# Patient Record
Sex: FEMALE | Race: WHITE | NOT HISPANIC OR LATINO | URBAN - METROPOLITAN AREA
[De-identification: names, ages, dates, MRNs, and addresses within clinical notes are randomized per-mention and may not be internally consistent; named-entity substitution may affect disease eponyms.]

---

## 2017-06-16 ENCOUNTER — INPATIENT (INPATIENT)
Facility: HOSPITAL | Age: 82
LOS: 3 days | Discharge: NURSING, REHAB OR LONG-TERM CARE | End: 2017-06-20
Attending: INTERNAL MEDICINE | Admitting: INTERNAL MEDICINE
Payer: MEDICARE

## 2017-06-16 VITALS
TEMPERATURE: 98 F | RESPIRATION RATE: 20 BRPM | OXYGEN SATURATION: 98 % | SYSTOLIC BLOOD PRESSURE: 127 MMHG | WEIGHT: 110.01 LBS | HEART RATE: 88 BPM | DIASTOLIC BLOOD PRESSURE: 71 MMHG

## 2017-06-16 LAB
ALBUMIN SERPL ELPH-MCNC: 2.2 G/DL — LOW (ref 3.3–5)
ALP SERPL-CCNC: 95 U/L — SIGNIFICANT CHANGE UP (ref 40–120)
ALT FLD-CCNC: <6 U/L — LOW (ref 12–78)
ANION GAP SERPL CALC-SCNC: 5 MMOL/L — SIGNIFICANT CHANGE UP (ref 5–17)
ANISOCYTOSIS BLD QL: SLIGHT — SIGNIFICANT CHANGE UP
APPEARANCE UR: (no result)
APTT BLD: 27 SEC — LOW (ref 27.5–37.4)
AST SERPL-CCNC: 12 U/L — LOW (ref 15–37)
BACTERIA # UR AUTO: (no result)
BASOPHILS # BLD AUTO: 0.6 K/UL — HIGH (ref 0–0.2)
BILIRUB SERPL-MCNC: 0.3 MG/DL — SIGNIFICANT CHANGE UP (ref 0.2–1.2)
BILIRUB UR-MCNC: NEGATIVE — SIGNIFICANT CHANGE UP
BUN SERPL-MCNC: 30 MG/DL — HIGH (ref 7–23)
CALCIUM SERPL-MCNC: 8.5 MG/DL — SIGNIFICANT CHANGE UP (ref 8.5–10.1)
CHLORIDE SERPL-SCNC: 111 MMOL/L — HIGH (ref 96–108)
CO2 SERPL-SCNC: 29 MMOL/L — SIGNIFICANT CHANGE UP (ref 22–31)
COLOR SPEC: YELLOW — SIGNIFICANT CHANGE UP
CREAT SERPL-MCNC: 1.43 MG/DL — HIGH (ref 0.5–1.3)
DIFF PNL FLD: (no result)
ELLIPTOCYTES BLD QL SMEAR: SLIGHT — SIGNIFICANT CHANGE UP
EOSINOPHIL # BLD AUTO: 0.2 K/UL — SIGNIFICANT CHANGE UP (ref 0–0.5)
EPI CELLS # UR: (no result)
GLUCOSE SERPL-MCNC: 85 MG/DL — SIGNIFICANT CHANGE UP (ref 70–99)
GLUCOSE UR QL: NEGATIVE MG/DL — SIGNIFICANT CHANGE UP
HCT VFR BLD CALC: 25.5 % — LOW (ref 34.5–45)
HGB BLD-MCNC: 7.6 G/DL — LOW (ref 11.5–15.5)
INR BLD: 1.03 RATIO — SIGNIFICANT CHANGE UP (ref 0.88–1.16)
KETONES UR-MCNC: NEGATIVE — SIGNIFICANT CHANGE UP
LACTATE SERPL-SCNC: 1.2 MMOL/L — SIGNIFICANT CHANGE UP (ref 0.7–2)
LEUKOCYTE ESTERASE UR-ACNC: (no result)
LYMPHOCYTES # BLD AUTO: 33.2 K/UL — HIGH (ref 1–3.3)
LYMPHOCYTES # BLD AUTO: 77 % — HIGH (ref 13–44)
MANUAL DIF COMMENT BLD-IMP: SIGNIFICANT CHANGE UP
MCHC RBC-ENTMCNC: 26.8 PG — LOW (ref 27–34)
MCHC RBC-ENTMCNC: 29.9 GM/DL — LOW (ref 32–36)
MCV RBC AUTO: 89.7 FL — SIGNIFICANT CHANGE UP (ref 80–100)
MICROCYTES BLD QL: SLIGHT — SIGNIFICANT CHANGE UP
MONOCYTES # BLD AUTO: 3.1 K/UL — HIGH (ref 0–0.9)
MONOCYTES NFR BLD AUTO: 1 % — LOW (ref 2–14)
NEUTROPHILS # BLD AUTO: 9.8 K/UL — HIGH (ref 1.8–7.4)
NEUTROPHILS NFR BLD AUTO: 22 % — LOW (ref 43–77)
NITRITE UR-MCNC: NEGATIVE — SIGNIFICANT CHANGE UP
PH UR: 6 — SIGNIFICANT CHANGE UP (ref 5–8)
PLAT MORPH BLD: NORMAL — SIGNIFICANT CHANGE UP
PLATELET # BLD AUTO: 216 K/UL — SIGNIFICANT CHANGE UP (ref 150–400)
POIKILOCYTOSIS BLD QL AUTO: SLIGHT — SIGNIFICANT CHANGE UP
POLYCHROMASIA BLD QL SMEAR: SLIGHT — SIGNIFICANT CHANGE UP
POTASSIUM SERPL-MCNC: 4.6 MMOL/L — SIGNIFICANT CHANGE UP (ref 3.5–5.3)
POTASSIUM SERPL-SCNC: 4.6 MMOL/L — SIGNIFICANT CHANGE UP (ref 3.5–5.3)
PROT SERPL-MCNC: 5.8 GM/DL — LOW (ref 6–8.3)
PROT UR-MCNC: 30 MG/DL
PROTHROM AB SERPL-ACNC: 11.1 SEC — SIGNIFICANT CHANGE UP (ref 9.8–12.7)
RBC # BLD: 2.84 M/UL — LOW (ref 3.8–5.2)
RBC # FLD: 18.8 % — HIGH (ref 10.3–14.5)
RBC BLD AUTO: (no result)
RBC CASTS # UR COMP ASSIST: (no result) /HPF (ref 0–4)
ROULEAUX BLD QL SMEAR: PRESENT — SIGNIFICANT CHANGE UP
SMUDGE CELLS # BLD: PRESENT — SIGNIFICANT CHANGE UP
SODIUM SERPL-SCNC: 145 MMOL/L — SIGNIFICANT CHANGE UP (ref 135–145)
SP GR SPEC: 1.02 — SIGNIFICANT CHANGE UP (ref 1.01–1.02)
TROPONIN I SERPL-MCNC: <0.015 NG/ML — SIGNIFICANT CHANGE UP (ref 0.01–0.04)
TROPONIN I SERPL-MCNC: <0.015 NG/ML — SIGNIFICANT CHANGE UP (ref 0.01–0.04)
UROBILINOGEN FLD QL: NEGATIVE MG/DL — SIGNIFICANT CHANGE UP
WBC # BLD: 47 K/UL — CRITICAL HIGH (ref 3.8–10.5)
WBC # FLD AUTO: 47 K/UL — CRITICAL HIGH (ref 3.8–10.5)
WBC UR QL: SIGNIFICANT CHANGE UP /HPF (ref 0–5)

## 2017-06-16 PROCEDURE — 71010: CPT | Mod: 26

## 2017-06-16 PROCEDURE — 70450 CT HEAD/BRAIN W/O DYE: CPT | Mod: 26

## 2017-06-16 PROCEDURE — 99285 EMERGENCY DEPT VISIT HI MDM: CPT

## 2017-06-16 PROCEDURE — 93010 ELECTROCARDIOGRAM REPORT: CPT

## 2017-06-16 RX ORDER — HEPARIN SODIUM 5000 [USP'U]/ML
5000 INJECTION INTRAVENOUS; SUBCUTANEOUS EVERY 12 HOURS
Qty: 0 | Refills: 0 | Status: DISCONTINUED | OUTPATIENT
Start: 2017-06-16 | End: 2017-06-20

## 2017-06-16 RX ORDER — MIRTAZAPINE 45 MG/1
15 TABLET, ORALLY DISINTEGRATING ORAL AT BEDTIME
Qty: 0 | Refills: 0 | Status: DISCONTINUED | OUTPATIENT
Start: 2017-06-16 | End: 2017-06-20

## 2017-06-16 RX ORDER — ONDANSETRON 8 MG/1
4 TABLET, FILM COATED ORAL EVERY 6 HOURS
Qty: 0 | Refills: 0 | Status: DISCONTINUED | OUTPATIENT
Start: 2017-06-16 | End: 2017-06-20

## 2017-06-16 RX ORDER — SODIUM CHLORIDE 9 MG/ML
1000 INJECTION INTRAMUSCULAR; INTRAVENOUS; SUBCUTANEOUS ONCE
Qty: 0 | Refills: 0 | Status: COMPLETED | OUTPATIENT
Start: 2017-06-16 | End: 2017-06-16

## 2017-06-16 RX ORDER — PANTOPRAZOLE SODIUM 20 MG/1
40 TABLET, DELAYED RELEASE ORAL
Qty: 0 | Refills: 0 | Status: DISCONTINUED | OUTPATIENT
Start: 2017-06-16 | End: 2017-06-20

## 2017-06-16 RX ORDER — DOCUSATE SODIUM 100 MG
100 CAPSULE ORAL
Qty: 0 | Refills: 0 | Status: DISCONTINUED | OUTPATIENT
Start: 2017-06-16 | End: 2017-06-20

## 2017-06-16 RX ORDER — SODIUM CHLORIDE 9 MG/ML
1000 INJECTION INTRAMUSCULAR; INTRAVENOUS; SUBCUTANEOUS
Qty: 0 | Refills: 0 | Status: DISCONTINUED | OUTPATIENT
Start: 2017-06-16 | End: 2017-06-18

## 2017-06-16 RX ADMIN — SODIUM CHLORIDE 1000 MILLILITER(S): 9 INJECTION INTRAMUSCULAR; INTRAVENOUS; SUBCUTANEOUS at 15:11

## 2017-06-16 RX ADMIN — SODIUM CHLORIDE 70 MILLILITER(S): 9 INJECTION INTRAMUSCULAR; INTRAVENOUS; SUBCUTANEOUS at 16:45

## 2017-06-16 RX ADMIN — HEPARIN SODIUM 5000 UNIT(S): 5000 INJECTION INTRAVENOUS; SUBCUTANEOUS at 17:49

## 2017-06-16 RX ADMIN — MIRTAZAPINE 15 MILLIGRAM(S): 45 TABLET, ORALLY DISINTEGRATING ORAL at 21:22

## 2017-06-16 RX ADMIN — SODIUM CHLORIDE 70 MILLILITER(S): 9 INJECTION INTRAMUSCULAR; INTRAVENOUS; SUBCUTANEOUS at 20:09

## 2017-06-16 NOTE — H&P ADULT - NSHPLABSRESULTS_GEN_ALL_CORE
CARDIAC MARKERS ( 2017 13:43 )  <0.015 ng/mL / x     / x     / x     / x                                7.6    47.0  )-----------( 216      ( 2017 13:43 )             25.5     2017 13:43    145    |  111    |  30     ----------------------------<  85     4.6     |  29     |  1.43     Ca    8.5        2017 13:43    TPro  5.8    /  Alb  2.2    /  TBili  0.3    /  DBili  x      /  AST  12     /  ALT  <6     /  AlkPhos  95     2017 13:43    PT/INR - ( 2017 13:43 )   PT: 11.1 sec;   INR: 1.03 ratio         PTT - ( 2017 13:43 )  PTT:27.0 sec  CAPILLARY BLOOD GLUCOSE    LIVER FUNCTIONS - ( 2017 13:43 )  Alb: 2.2 g/dL / Pro: 5.8 gm/dL / ALK PHOS: 95 U/L / ALT: <6 U/L / AST: 12 U/L / GGT: x           Urinalysis Basic - ( 2017 15:09 )    Color: Yellow / Appearance: very cloudy / S.020 / pH: x  Gluc: x / Ketone: Negative  / Bili: Negative / Urobili: Negative mg/dL   Blood: x / Protein: 30 mg/dL / Nitrite: Negative   Leuk Esterase: Moderate / RBC: x / WBC x   Sq Epi: x / Non Sq Epi: x / Bacteria: x

## 2017-06-16 NOTE — ED ADULT NURSE REASSESSMENT NOTE - NS ED NURSE REASSESS COMMENT FT1
Pt resting comfortable, VSS. Pt straight cath for urine sample. Scant amount of urine and sy was not advancing easily. Dr. Hobbs made aware.

## 2017-06-16 NOTE — ED ADULT TRIAGE NOTE - CHIEF COMPLAINT QUOTE
Patient presents after syncopal episode at 10:30am. As per nursing home patient has abnormal hemoglobin. As per nursing home facility patient at baseline mental status

## 2017-06-16 NOTE — ED PROVIDER NOTE - OBJECTIVE STATEMENT
89 y/o female with PMHx of dementia BIB EMS. Pt states that she lives with her daughter. Denies any pain, cough, vomiting, diarrhea. As per EMS. Pt lives at nursing home and was sent to the ED s/p syncope. 89 y/o female with PMHx of dementia BIB EMS. Pt states that she lives with her daughter. Denies any pain, cough, vomiting, diarrhea. As per EMS. Pt lives at [facility] nursing [facility] and was sent to the ED s/p syncope and unresponsiveness.

## 2017-06-16 NOTE — ED PROVIDER NOTE - PSYCHIATRIC, MLM
Alert and disoriented to person, place, time/situation. normal mood and affect. no apparent risk to self or others.

## 2017-06-16 NOTE — H&P ADULT - NSHPREVIEWOFSYSTEMS_GEN_ALL_CORE
(-)Fever, chills, cough, chest pain, sob, headache, dizziness, palpitations, abd pain, n/v/d, leg swelling  (+)weakness, fatigue

## 2017-06-16 NOTE — ED PROVIDER NOTE - PROGRESS NOTE DETAILS
Attending hector Hobbs/w Dr. rudolph for admission Attending Faby, rectal - brown stool heme +, qc reactive

## 2017-06-16 NOTE — PATIENT PROFILE ADULT. - PMH
Anemia, unspecified type    CAD (coronary artery disease)    Cerebrovascular accident (CVA), unspecified mechanism    CLL (chronic lymphocytic leukemia)    Dementia    Syncope

## 2017-06-16 NOTE — H&P ADULT - ASSESSMENT
89yo female a/w failure to thrive      # Weakness/Fatigue/Failure to thrive  - afebrile, HD stable, O2 sat 97% on room air  - UA negative, CXR without focal consolidation, no evidence of infection on labs, has chronic elevated WBC 2/2 CLL  - CT head negative  - baseline Cr 1.5 as per PMD  - PT consult  - ECHO  - monitor HH, would transfuse HH<7  - Palliative care consult, goals of care discussion    # Dementia  - fall precaution    # CKD  - Cr stable    # DVT ppx, HSQ    # Admit, stable

## 2017-06-16 NOTE — ED PROVIDER NOTE - CARE PLAN
Principal Discharge DX:	Syncope, unspecified syncope type  Secondary Diagnosis:	Anemia, unspecified type  Secondary Diagnosis:	CLL (chronic lymphocytic leukemia)

## 2017-06-16 NOTE — H&P ADULT - HISTORY OF PRESENT ILLNESS
Patient is 89yo female with PMHx of CLL, mild dementia, CAD, Anemia, presents to the hospital with weakness, fatigue, failure to thrive. As per DR Zayas, who is her physician, she has slowly been declining in function over the last few months, constituted by weakness, failure to thrive. Patient has chronic anemia, baseline HH 6-7, today, 7.6. Pt denies any major complaints, reports to be doing well. Denies fever, chills, cough, chest pain, sob, palpitations, headache, dizziness. No other constitutional symptoms.

## 2017-06-17 LAB
ANION GAP SERPL CALC-SCNC: 6 MMOL/L — SIGNIFICANT CHANGE UP (ref 5–17)
ANISOCYTOSIS BLD QL: SLIGHT — SIGNIFICANT CHANGE UP
BASOPHILS # BLD AUTO: HIGH K/UL (ref 0–0.2)
BASOPHILS NFR BLD AUTO: 0 % — SIGNIFICANT CHANGE UP (ref 0–2)
BUN SERPL-MCNC: 27 MG/DL — HIGH (ref 7–23)
BURR CELLS BLD QL SMEAR: PRESENT — SIGNIFICANT CHANGE UP
CALCIUM SERPL-MCNC: 8.3 MG/DL — LOW (ref 8.5–10.1)
CHLORIDE SERPL-SCNC: 114 MMOL/L — HIGH (ref 96–108)
CO2 SERPL-SCNC: 24 MMOL/L — SIGNIFICANT CHANGE UP (ref 22–31)
CREAT SERPL-MCNC: 1.31 MG/DL — HIGH (ref 0.5–1.3)
DACRYOCYTES BLD QL SMEAR: SLIGHT — SIGNIFICANT CHANGE UP
EOSINOPHIL # BLD AUTO: SIGNIFICANT CHANGE UP K/UL (ref 0–0.5)
EOSINOPHIL NFR BLD AUTO: 0 % — SIGNIFICANT CHANGE UP (ref 0–6)
GLUCOSE SERPL-MCNC: 104 MG/DL — HIGH (ref 70–99)
HCT VFR BLD CALC: 22.9 % — LOW (ref 34.5–45)
HGB BLD-MCNC: 7.2 G/DL — LOW (ref 11.5–15.5)
HYPOCHROMIA BLD QL: SLIGHT — SIGNIFICANT CHANGE UP
LYMPHOCYTES # BLD AUTO: 51 % — HIGH (ref 13–44)
LYMPHOCYTES # BLD AUTO: HIGH K/UL (ref 1–3.3)
LYMPHOCYTES # SPEC AUTO: 18 % — HIGH (ref 0–0)
MACROCYTES BLD QL: SLIGHT — SIGNIFICANT CHANGE UP
MANUAL DIF COMMENT BLD-IMP: SIGNIFICANT CHANGE UP
MCHC RBC-ENTMCNC: 28.1 PG — SIGNIFICANT CHANGE UP (ref 27–34)
MCHC RBC-ENTMCNC: 31.3 GM/DL — LOW (ref 32–36)
MCV RBC AUTO: 89.9 FL — SIGNIFICANT CHANGE UP (ref 80–100)
MICROCYTES BLD QL: SLIGHT — SIGNIFICANT CHANGE UP
MONOCYTES # BLD AUTO: HIGH K/UL (ref 0–0.9)
MONOCYTES NFR BLD AUTO: 2 % — SIGNIFICANT CHANGE UP (ref 2–14)
NEUTROPHILS # BLD AUTO: HIGH K/UL (ref 1.8–7.4)
NEUTROPHILS NFR BLD AUTO: 25 % — LOW (ref 43–77)
NRBC # BLD: 1 /100 — HIGH (ref 0–0)
OVALOCYTES BLD QL SMEAR: SLIGHT — SIGNIFICANT CHANGE UP
PLAT MORPH BLD: NORMAL — SIGNIFICANT CHANGE UP
PLATELET # BLD AUTO: 227 K/UL — SIGNIFICANT CHANGE UP (ref 150–400)
PLATELET COUNT - ESTIMATE: NORMAL — SIGNIFICANT CHANGE UP
POIKILOCYTOSIS BLD QL AUTO: SLIGHT — SIGNIFICANT CHANGE UP
POLYCHROMASIA BLD QL SMEAR: SLIGHT — SIGNIFICANT CHANGE UP
POTASSIUM SERPL-MCNC: 4 MMOL/L — SIGNIFICANT CHANGE UP (ref 3.5–5.3)
POTASSIUM SERPL-SCNC: 4 MMOL/L — SIGNIFICANT CHANGE UP (ref 3.5–5.3)
RBC # BLD: 2.55 M/UL — LOW (ref 3.8–5.2)
RBC # FLD: 19.4 % — HIGH (ref 10.3–14.5)
RBC BLD AUTO: (no result)
SCHISTOCYTES BLD QL AUTO: SLIGHT — SIGNIFICANT CHANGE UP
SMUDGE CELLS # BLD: PRESENT — SIGNIFICANT CHANGE UP
SODIUM SERPL-SCNC: 144 MMOL/L — SIGNIFICANT CHANGE UP (ref 135–145)
VARIANT LYMPHS # BLD: 4 % — SIGNIFICANT CHANGE UP (ref 0–6)
WBC # BLD: 52.4 K/UL — CRITICAL HIGH (ref 3.8–10.5)
WBC # FLD AUTO: 52.4 K/UL — CRITICAL HIGH (ref 3.8–10.5)

## 2017-06-17 PROCEDURE — 93010 ELECTROCARDIOGRAM REPORT: CPT

## 2017-06-17 RX ORDER — CEFTRIAXONE 500 MG/1
1 INJECTION, POWDER, FOR SOLUTION INTRAMUSCULAR; INTRAVENOUS ONCE
Qty: 0 | Refills: 0 | Status: COMPLETED | OUTPATIENT
Start: 2017-06-17 | End: 2017-06-17

## 2017-06-17 RX ORDER — SODIUM CHLORIDE 9 MG/ML
1000 INJECTION INTRAMUSCULAR; INTRAVENOUS; SUBCUTANEOUS ONCE
Qty: 0 | Refills: 0 | Status: COMPLETED | OUTPATIENT
Start: 2017-06-17 | End: 2017-06-17

## 2017-06-17 RX ORDER — CEFTRIAXONE 500 MG/1
INJECTION, POWDER, FOR SOLUTION INTRAMUSCULAR; INTRAVENOUS
Qty: 0 | Refills: 0 | Status: DISCONTINUED | OUTPATIENT
Start: 2017-06-17 | End: 2017-06-18

## 2017-06-17 RX ORDER — CEFTRIAXONE 500 MG/1
1 INJECTION, POWDER, FOR SOLUTION INTRAMUSCULAR; INTRAVENOUS EVERY 24 HOURS
Qty: 0 | Refills: 0 | Status: DISCONTINUED | OUTPATIENT
Start: 2017-06-18 | End: 2017-06-18

## 2017-06-17 RX ADMIN — SODIUM CHLORIDE 1000 MILLILITER(S): 9 INJECTION INTRAMUSCULAR; INTRAVENOUS; SUBCUTANEOUS at 13:47

## 2017-06-17 RX ADMIN — SODIUM CHLORIDE 70 MILLILITER(S): 9 INJECTION INTRAMUSCULAR; INTRAVENOUS; SUBCUTANEOUS at 08:24

## 2017-06-17 RX ADMIN — SODIUM CHLORIDE 70 MILLILITER(S): 9 INJECTION INTRAMUSCULAR; INTRAVENOUS; SUBCUTANEOUS at 14:03

## 2017-06-17 RX ADMIN — MIRTAZAPINE 15 MILLIGRAM(S): 45 TABLET, ORALLY DISINTEGRATING ORAL at 22:11

## 2017-06-17 RX ADMIN — HEPARIN SODIUM 5000 UNIT(S): 5000 INJECTION INTRAVENOUS; SUBCUTANEOUS at 17:38

## 2017-06-17 RX ADMIN — HEPARIN SODIUM 5000 UNIT(S): 5000 INJECTION INTRAVENOUS; SUBCUTANEOUS at 05:30

## 2017-06-17 RX ADMIN — CEFTRIAXONE 100 GRAM(S): 500 INJECTION, POWDER, FOR SOLUTION INTRAMUSCULAR; INTRAVENOUS at 14:03

## 2017-06-17 NOTE — DIETITIAN INITIAL EVALUATION ADULT. - NS AS NUTRI DX NUTRIENT
NFPE reveals severe muscle wasting: temporal, clavicle, scapula. Severe fat loss: Ocular, tricep, and ribs. Weight history unknown. Pt meets criteria for severe protein/calorie malnutrition in context of chronic illness secondary to severe body fat loss, and severe muscle wasting./Malnutrition

## 2017-06-17 NOTE — CONSULT NOTE ADULT - ASSESSMENT
89 yo female coming from [place] w/ PMHx CLL, Esophageal adenocarcinoma, Stricture of GE junction s/p stent in 2/2016, s/p XRT, HTN, CVA w/ expressive aphasia 8/16, dementia, anemia,  admitted 6/16 for syncope, unresponsiveness, weakness, fatigue, and overall failure to thrive, with noted decline over past few months. Found to be anemic, 7.7, now with episodes of hypotension, and combativeness preventing IV insertion/integrity. Palliative care called to help establish GOC. Of note seen by team in past, and declined palliative services on discharge.    1. Altered mental status -  - likely also had element of dementia with psych eval on prior admission showing 19/30 MME  - also hx of left cerebral infarct with mild expressive aphasia  - appears to be at baseline  - fall and aspiration precautions  - frequent reorientation    2) Hypotension  - likely dehydrated  - giving IvF as possible based on availability of IV access    3) Anemia  - seems to be at baseline  - also with hx of esophageal ca and CLL    4) Malnutrition  - severe malnutrition  - albumin 2.2 on admission  - PPS<40%, decline further from last admission    5) Prognosis  - overall poor prognosis  - given advanced age, dementia, CLL, anemia, hypotension inability to maintain adequate nutrition, falls and PPS<40%, hx of esophogeal ca, pt would fit criteria for hospice with either diagnosis of dementia or cancer     6) GOC/Advanced Directives  - pt does not have capacity for decision making, on last admission this was confirmed by psych  - no official HCP, thus surrogate decision maker would be her daughter Bailey Le [phone] followed by nephew Fe [phone]  - full code, despite extensive discussion with primary and follow up by our team  - at this time daughter was not interested in meeting with us or having us on board. She also does not want to set any limits, having heard all the risks to the patient or worsened QOL, they would like a trial.     Given this sentiment and pt's apparent comfort will sign off at this time. Of course if family changes their mind and would like our support, please do not hesitate to call us back.    Thank you for including us in Ms. Le's care. Will continue to follow with you.    Devin Cummins MD  Palliative Care Attending 89 yo female coming from [place] w/ PMHx CLL, Esophageal adenocarcinoma, Stricture of GE junction s/p stent in 2/2016, s/p XRT, HTN, CVA w/ expressive aphasia 8/16, dementia, anemia,  admitted 6/16 for syncope, unresponsiveness, weakness, fatigue, and overall failure to thrive, with noted decline over past few months. Found to be anemic, 7.7, now with episodes of hypotension, and combativeness preventing IV insertion/integrity. Palliative care called to help establish GOC. Of note seen by team in past, and declined palliative services on discharge.    1. Altered mental status -  - likely also had element of dementia with psych eval on prior admission showing 19/30 MME  - also hx of left cerebral infarct with mild expressive aphasia  - appears to be at baseline  - fall and aspiration precautions  - frequent reorientation    2) Hypotension  - likely dehydrated  - giving IvF as possible based on availability of IV access    3) Anemia  - seems to be at baseline  - also with hx of esophageal ca and CLL    4) Malnutrition  - severe malnutrition  - albumin 2.2 on admission  - PPS<40%, decline further from last admission    5) Prognosis  - overall poor prognosis  - given advanced age, dementia, CLL, anemia, hypotension inability to maintain adequate nutrition, falls and PPS<40%, hx of esophogeal ca, pt would fit criteria for hospice with either diagnosis of dementia or cancer     6) GOC/Advanced Directives  - pt does not have capacity for decision making, on last admission this was confirmed by psych  - no official HCP, thus surrogate decision maker would be her daughter Bailey Le [phone] followed by nephew Fe [phone]  - full code, despite extensive discussion with primary and follow up by our team  - at this time daughter was not interested in meeting with us or having us on board. She also does not want to set any limits, having heard all the risks to the patient or worsened QOL, they would like a trial. *Spent 20 minutes discussing GOC and advanced directives with daughter, including code status and the risks associated with CPR in light of the above.    Given this sentiment and pt's apparent comfort will sign off at this time. Of course if family changes their mind and would like our support, please do not hesitate to call us back.    Thank you for including us in Ms. Le's care. Will continue to follow with you.    Devin Cummins MD  Palliative Care Attending

## 2017-06-17 NOTE — DIETITIAN INITIAL EVALUATION ADULT. - OTHER INFO
Nutrition consult received secondary to FTT. Pt unable to be interviewed secondary to lethargy and cognitive status (seems confused). PO intake poor at this time with total assistance. No pressure ulcers noted. +3 edema (L/knee)(L/leg)(L/ankle)(L/foot). NFPE reveals severe muscle wasting: temporal, clavicle, scapula. Severe fat loss: Ocular, tricep, and ribs. Weight history unknown. Pt meets criteria for severe protein/calorie malnutrition in context of chronic illness secondary to severe body fat loss, and severe muscle wasting. Suggest additional supplementation Ensure enlive 8oz. po TID and Prosource 1oz. po TID. Recommend SLP evaluation to determine appropriate po fluid/food consistency to promote optimal nutrition and decrease risk of aspiration.

## 2017-06-17 NOTE — PROGRESS NOTE ADULT - SUBJECTIVE AND OBJECTIVE BOX
CHIEF COMPLAINT: weakness, failure to thrive    SUBJECTIVE: This AM pt refusing IV, uncooperative with staff, refusing po.  D/w daughter on who is the medical decision maker on mult occasions- wants full code.     REVIEW OF SYSTEMS:  All other review of systems is negative unless indicated above    Vital Signs Last 24 Hrs  T(C): 36.6, Max: 36.9 (06-16 @ 17:46)  T(F): 97.9, Max: 98.4 (06-16 @ 17:46)  HR: 99 (81 - 110)  BP: 126/72 (91/46 - 146/78)  BP(mean): --  RR: 17 (17 - 18)  SpO2: 90% (82% - 97%)    PHYSICAL EXAM:  Constitutional: NAD, AO x2.   HEENT: EOMI,  MMM  Neck: Soft and supple, No JVD  Respiratory: Breath sounds are clear bilaterally, No wheezing, rales or rhonchi  Cardiovascular: S1S2 irregular. tachycardic.  Gastrointestinal: Bowel Sounds present, soft, nontender, nondistended, no guarding, no rebound  Extremities: No peripheral edema  Vascular: 2+ peripheral pulses  Neurological: A/O x 3, no focal deficits  Musculoskeletal: 5/5 strength b/l upper and lower extremities  Skin: No rashes    MEDICATIONS:  MEDICATIONS  (STANDING):  docusate sodium 100milliGRAM(s) Oral two times a day  pantoprazole    Tablet 40milliGRAM(s) Oral before breakfast  mirtazapine 15milliGRAM(s) Oral at bedtime  heparin  Injectable 5000Unit(s) SubCutaneous every 12 hours  sodium chloride 0.9%. 1000milliLiter(s) IV Continuous <Continuous>  multivitamin 1Tablet(s) Oral daily  cefTRIAXone   IVPB  IV Intermittent       LABS: All Labs Reviewed:                        7.2    52.4  )-----------( 227      ( 17 Jun 2017 12:08 )             22.9     06-17    144  |  114<H>  |  27<H>  ----------------------------<  104<H>  4.0   |  24  |  1.31<H>    Ca    8.3<L>      17 Jun 2017 12:08    TPro  5.8<L>  /  Alb  2.2<L>  /  TBili  0.3  /  DBili  x   /  AST  12<L>  /  ALT  <6<L>  /  AlkPhos  95  06-16    PT/INR - ( 16 Jun 2017 13:43 )   PT: 11.1 sec;   INR: 1.03 ratio         PTT - ( 16 Jun 2017 13:43 )  PTT:27.0 sec  CARDIAC MARKERS ( 16 Jun 2017 16:04 )  <0.015 ng/mL / x     / x     / x     / x      CARDIAC MARKERS ( 16 Jun 2017 13:43 )  <0.015 ng/mL / x     / x     / x     / x
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
## 2017-06-17 NOTE — PROGRESS NOTE ADULT - ASSESSMENT
89yo female a/w failure to thrive and possible sepsis    # Weakness/Fatigue/Failure to thrive possibly 2/2 sepsis  - afebrile, HD stable, O2 sat 97% on room air.  Tachycardic, WBC elevated d/t underlying leukemia.   - UA positive however is comtaminated w many sq epis- will repeat  , CXR without focal consolidation, has chronic elevated WBC 2/2 CLL  - CT head negative  - baseline Cr 1.5 as per PMD  - PT consult  - ECHO  - monitor HH, would transfuse HH<7  - Rocephin D1  - Palliative care consult, goals of care discussion- daughter wants full code for now, still deciding.    # Dehydration  - IVF    # Tachycardia (~140), questionable episode of afib  - No afib on EKG  - transfer tele for 24 hr- if no afib transfer back to med-surg    # Dementia  - fall precaution    # CKD  - Cr stable    # DVT ppx, HSQ    # Admit, stable

## 2017-06-18 LAB
ANION GAP SERPL CALC-SCNC: 6 MMOL/L — SIGNIFICANT CHANGE UP (ref 5–17)
BUN SERPL-MCNC: 23 MG/DL — SIGNIFICANT CHANGE UP (ref 7–23)
CALCIUM SERPL-MCNC: 7.6 MG/DL — LOW (ref 8.5–10.1)
CHLORIDE SERPL-SCNC: 116 MMOL/L — HIGH (ref 96–108)
CO2 SERPL-SCNC: 26 MMOL/L — SIGNIFICANT CHANGE UP (ref 22–31)
CREAT SERPL-MCNC: 1.21 MG/DL — SIGNIFICANT CHANGE UP (ref 0.5–1.3)
GLUCOSE SERPL-MCNC: 83 MG/DL — SIGNIFICANT CHANGE UP (ref 70–99)
POTASSIUM SERPL-MCNC: 3.8 MMOL/L — SIGNIFICANT CHANGE UP (ref 3.5–5.3)
POTASSIUM SERPL-SCNC: 3.8 MMOL/L — SIGNIFICANT CHANGE UP (ref 3.5–5.3)
SODIUM SERPL-SCNC: 148 MMOL/L — HIGH (ref 135–145)

## 2017-06-18 RX ORDER — SODIUM CHLORIDE 9 MG/ML
1000 INJECTION, SOLUTION INTRAVENOUS
Qty: 0 | Refills: 0 | Status: DISCONTINUED | OUTPATIENT
Start: 2017-06-18 | End: 2017-06-18

## 2017-06-18 RX ORDER — CEFUROXIME AXETIL 250 MG
250 TABLET ORAL EVERY 12 HOURS
Qty: 0 | Refills: 0 | Status: DISCONTINUED | OUTPATIENT
Start: 2017-06-18 | End: 2017-06-20

## 2017-06-18 RX ADMIN — Medication 100 MILLIGRAM(S): at 17:01

## 2017-06-18 RX ADMIN — HEPARIN SODIUM 5000 UNIT(S): 5000 INJECTION INTRAVENOUS; SUBCUTANEOUS at 17:01

## 2017-06-18 RX ADMIN — Medication 250 MILLIGRAM(S): at 17:01

## 2017-06-18 RX ADMIN — CEFTRIAXONE 100 GRAM(S): 500 INJECTION, POWDER, FOR SOLUTION INTRAMUSCULAR; INTRAVENOUS at 09:52

## 2017-06-18 NOTE — PROGRESS NOTE ADULT - ASSESSMENT
89yo female a/w failure to thrive and possible sepsis    # Weakness/Fatigue/Failure to thrive possibly 2/2 sepsis  - afebrile, HD stable, O2 sat 97% on room air.  Tachycardic, WBC elevated d/t underlying leukemia.   - UA positive however is comtaminated w many sq epis.  Ucx was not sent.  , CXR without focal consolidation, has chronic elevated WBC 2/2 CLL  - CT head negative  - baseline Cr 1.5 as per PMD  - PT consult  - ECHO refused by patient  - monitor HH, would transfuse HH<7  - got dose of rocephin however refusing IV. switch to po ceftin day 2 abx.  - Palliative care consult appreciated, goals of care discussion- daughter wants full code for now, still deciding. Pall care signed off for now.  I had a long discussion with the daughter who still wants patient to be a full code.    # Dehydration- improved  - IVF stopped d/t no IV access, pt refusing new IV. encourage po.    # Tachycardia (~140), PAT.  - No afib on EKG or on tele monitor, few episodes PAT on monitor  - transfer back to med-surg    # Dementia  - fall precaution    #SHEN on CKD3- resolved  - likely prerenal    # DVT ppx, HSQ    Dispo: transfer back to med-surg. Likely dc back to [facility] tomorrow with course of po abx. 89yo female a/w failure to thrive and possible sepsis    # Weakness/Fatigue/Failure to thrive possibly 2/2 sepsis  - afebrile, HD stable, O2 sat 97% on room air.  Tachycardic, WBC elevated d/t underlying leukemia.   - UA positive however is comtaminated w many sq epis.  Ucx was not sent.  , CXR without focal consolidation, has chronic elevated WBC 2/2 CLL  - CT head negative  - baseline Cr 1.5 as per PMD  - PT consult  - ECHO refused by patient  - monitor HH, would transfuse HH<7  - got dose of rocephin however refusing IV. switch to po ceftin day 2 abx.  - Palliative care consult appreciated, goals of care discussion- daughter wants full code for now, still deciding. Pall care signed off for now.  I had a long discussion with the daughter who still wants patient to be a full code.    # Dehydration- improved  - IVF stopped d/t no IV access, pt refusing new IV. encourage po.    # Hypernatremia  - encourage po as pt refusing IV    # Tachycardia (~140), PAT.  - No afib on EKG or on tele monitor, few episodes PAT on monitor  - transfer back to med-surg    # Dementia  - fall precaution    #SHEN on CKD3- resolved  - likely prerenal    # Severe protein calorie malnutrition  - ensure enlive supplement    # DVT ppx, HSQ    Dispo:  transfer back to med-surg. Lik[facility] dc back to [facility] tomorrow with course of po abx.

## 2017-06-18 NOTE — PROGRESS NOTE ADULT - SUBJECTIVE AND OBJECTIVE BOX
CHIEF COMPLAINT: weakness, failure to thrive    SUBJECTIVE: Pt pulled out IV and refused new IV.  Does not want IVF or IV abx. Agreeable to oral antibiotics. Has not complaints.    REVIEW OF SYSTEMS:  All other review of systems is negative unless indicated above    Vital Signs Last 24 Hrs  T(C): 36.7, Max: 36.9 (06-18 @ 05:39)  T(F): 98.1, Max: 98.4 (06-18 @ 05:39)  HR: 88 (82 - 99)  BP: 165/80 (112/52 - 165/80)  BP(mean): 66 (66 - 66)  RR: 18 (17 - 18)  SpO2: 95% (90% - 96%)    PHYSICAL EXAM:  Constitutional: NAD, AO x2.   HEENT: EOMI,  MMM  Neck: Soft and supple, No JVD  Respiratory: Breath sounds are clear bilaterally, No wheezing, rales or rhonchi  Cardiovascular: S1S2 irregular. tachycardic.  Gastrointestinal: Bowel Sounds present, soft, nontender, nondistended, no guarding, no rebound  Extremities: No peripheral edema  Vascular: 2+ peripheral pulses  Neurological: A/O x 3, no focal deficits  Musculoskeletal: 5/5 strength b/l upper and lower extremities  Skin: No rashes    MEDICATIONS:  MEDICATIONS  (STANDING):  docusate sodium 100milliGRAM(s) Oral two times a day  pantoprazole    Tablet 40milliGRAM(s) Oral before breakfast  mirtazapine 15milliGRAM(s) Oral at bedtime  heparin  Injectable 5000Unit(s) SubCutaneous every 12 hours  multivitamin 1Tablet(s) Oral daily  cefuroxime   Tablet 250milliGRAM(s) Oral every 12 hours      LABS: All Labs Reviewed:                        7.2    52.4  )-----------( 227      ( 17 Jun 2017 12:08 )             22.9     06-18    148<H>  |  116<H>  |  23  ----------------------------<  83  3.8   |  26  |  1.21    Ca    7.6<L>      18 Jun 2017 06:00      CARDIAC MARKERS ( 16 Jun 2017 16:04 )  <0.015 ng/mL / x     / x     / x     / x        Blood Culture: 06-16 @ 16:04  Organism --  Gram Stain Blood -- Gram Stain --  Specimen Source .Blood None  Culture-Blood --    06-16 @ 16:03  Organism --  Gram Stain Blood -- Gram Stain --  Specimen Source .Blood None  Culture-Blood --

## 2017-06-19 LAB
ANION GAP SERPL CALC-SCNC: 6 MMOL/L — SIGNIFICANT CHANGE UP (ref 5–17)
BUN SERPL-MCNC: 28 MG/DL — HIGH (ref 7–23)
CALCIUM SERPL-MCNC: 8.1 MG/DL — LOW (ref 8.5–10.1)
CHLORIDE SERPL-SCNC: 117 MMOL/L — HIGH (ref 96–108)
CO2 SERPL-SCNC: 25 MMOL/L — SIGNIFICANT CHANGE UP (ref 22–31)
CREAT SERPL-MCNC: 1.17 MG/DL — SIGNIFICANT CHANGE UP (ref 0.5–1.3)
GLUCOSE SERPL-MCNC: 94 MG/DL — SIGNIFICANT CHANGE UP (ref 70–99)
POTASSIUM SERPL-MCNC: 3.9 MMOL/L — SIGNIFICANT CHANGE UP (ref 3.5–5.3)
POTASSIUM SERPL-SCNC: 3.9 MMOL/L — SIGNIFICANT CHANGE UP (ref 3.5–5.3)
SODIUM SERPL-SCNC: 148 MMOL/L — HIGH (ref 135–145)

## 2017-06-19 RX ORDER — CEFUROXIME AXETIL 250 MG
1 TABLET ORAL
Qty: 0 | Refills: 0 | COMMUNITY
Start: 2017-06-19

## 2017-06-19 RX ORDER — MIRTAZAPINE 45 MG/1
1 TABLET, ORALLY DISINTEGRATING ORAL
Qty: 0 | Refills: 0 | COMMUNITY
Start: 2017-06-19

## 2017-06-19 RX ORDER — PANTOPRAZOLE SODIUM 20 MG/1
1 TABLET, DELAYED RELEASE ORAL
Qty: 0 | Refills: 0 | COMMUNITY
Start: 2017-06-19

## 2017-06-19 RX ORDER — DOCUSATE SODIUM 100 MG
1 CAPSULE ORAL
Qty: 0 | Refills: 0 | COMMUNITY
Start: 2017-06-19

## 2017-06-19 RX ADMIN — MIRTAZAPINE 15 MILLIGRAM(S): 45 TABLET, ORALLY DISINTEGRATING ORAL at 22:23

## 2017-06-19 RX ADMIN — HEPARIN SODIUM 5000 UNIT(S): 5000 INJECTION INTRAVENOUS; SUBCUTANEOUS at 05:36

## 2017-06-19 RX ADMIN — Medication 250 MILLIGRAM(S): at 05:36

## 2017-06-19 RX ADMIN — PANTOPRAZOLE SODIUM 40 MILLIGRAM(S): 20 TABLET, DELAYED RELEASE ORAL at 06:12

## 2017-06-19 RX ADMIN — Medication 250 MILLIGRAM(S): at 17:40

## 2017-06-19 RX ADMIN — Medication 100 MILLIGRAM(S): at 05:36

## 2017-06-19 RX ADMIN — HEPARIN SODIUM 5000 UNIT(S): 5000 INJECTION INTRAVENOUS; SUBCUTANEOUS at 17:40

## 2017-06-19 NOTE — DISCHARGE NOTE ADULT - PLAN OF CARE
supportive care follow up with your pcp and outpatient specialists. supportive care. You may need a blood transfusion in the future. Please follow up with your pcp and or hematologist/oncologist. Please follow up with your pcp and or hematologist/oncologist. take antibiotics as prescribed.

## 2017-06-19 NOTE — DISCHARGE NOTE ADULT - HOSPITAL COURSE
7yo female with PMHx of CLL, mild dementia, CAD, Anemia, presents to the hospital with weakness, fatigue, failure to thrive. As per DR Zayas, who is her physician, she has slowly been declining in function over the last few months, constituted by weakness, failure to thrive. Patient has chronic anemia, baseline HH 6-7, today, 7.6. Pt denies any major complaints, reports to be doing well. Denies fever, chills, cough, chest pain, sob, palpitations, headache, dizziness. No other constitutional symptoms.  Pt was admitted for possible sepsis and UTI for which she was given IV abx but later on pt refused medical intervention and so medications were changed to oral and fluids were stopped.   Her weakness/Fatigue/FTT is multi-factorial and declining progressively, likely related to underlying acute on chronic comorbidities including CLL/anemia, dementia, severe protein malnutrition and possible UTI. She has been afebrile, HD stable, O2 sat 97% on room air.  Tachycardic, WBC elevated d/t underlying leukemia. UA positive however is comtaminated w many sq epis.  Ucx was not sent. CXR without focal consolidation, has chronic elevated WBC 2/2 CLL. CT head negative.  She was ruled out for significant acute issues. BCx negative.  ECHO refused by patient. She got dose of rocephin however refusing IV. switch to po ceftin for abx course. Palliative care consult appreciated, goals of care discussion- daughter wants full code for now, still deciding. Pall care signed off.  I had a long discussion with the daughter who still wants patient to be a full code. Her dehydration- improved however IVF stopped d/t no IV access, pt refusing new IV. encourage po. Her hypernatremia remains boderline, secondary to poor po intake. Encourage po as pt refusing IV.  Pt also with lower extremity edema worse on the left: Multifactorial, poor albumin/nutrition, poor functional status, underlying CLL and comorbidities.  Her Tachycardia (~140), PAT with no afib on EKG or on tele monitor, few episodes PAT on monitor. She will need outpt follow up. 7yo female with PMHx of CLL, mild dementia, CAD, Anemia, presents to the hospital with weakness, fatigue, failure to thrive. As per DR Zayas, who is her physician, she has slowly been declining in function over the last few months, constituted by weakness, failure to thrive. Patient has chronic anemia, baseline HH 6-7, today, 7.6. Pt denies any major complaints, reports to be doing well. Denies fever, chills, cough, chest pain, sob, palpitations, headache, dizziness. No other constitutional symptoms.  Pt was admitted for possible sepsis and UTI for which she was given IV abx but later on pt refused medical intervention and so medications were changed to oral and fluids were stopped.   Her weakness/Fatigue/FTT is multi-factorial and declining progressively, likely related to underlying acute on chronic comorbidities including CLL/anemia, dementia, severe protein malnutrition and possible UTI. She has been afebrile, HD stable, O2 sat 97% on room air.  Tachycardic, WBC elevated d/t underlying leukemia. UA positive however is comtaminated w many sq epis.  Ucx was not sent. CXR without focal consolidation, has chronic elevated WBC 2/2 CLL. CT head negative.  She was ruled out for significant acute issues. BCx negative.  ECHO refused by patient. She got dose of rocephin however refusing IV. switch to po ceftin for abx course. Palliative care consult appreciated, goals of care discussion- daughter wants full code for now, still deciding. Pall care signed off.  I had a long discussion with the daughter who still wants patient to be a full code. Her dehydration- improved however IVF stopped d/t no IV access, pt refusing new IV. encourage po. Her hypernatremia remains boderline, secondary to poor po intake. Encourage po as pt refusing IV.  Pt also with lower extremity edema worse on the left: Multifactorial, poor albumin/nutrition, poor functional status, underlying CLL and comorbidities.  Her Tachycardia (~140), PAT with no afib on EKG or on tele monitor, few episodes PAT on monitor. She will need outpt follow up.  Pt was made DNR/DNI on 6/20//17.

## 2017-06-19 NOTE — PHYSICAL THERAPY INITIAL EVALUATION ADULT - ACTIVE RANGE OF MOTION EXAMINATION, REHAB EVAL
bilateral  lower extremity Active ROM was WFL (within functional limits)/b/l shoulder flexion limited passive and actively, decreased L active knee extension/bilateral upper extremity Active ROM was WFL (within functional limits)/deficits as listed below

## 2017-06-19 NOTE — DISCHARGE NOTE ADULT - CARE PROVIDER_API CALL
Mehran Frost (MD), Internal Medicine  [address] [facility]   [address] [place] [address]  Phone: [phone]  Fax: [phone]

## 2017-06-19 NOTE — DISCHARGE NOTE ADULT - MEDICATION SUMMARY - MEDICATIONS TO TAKE
I will START or STAY ON the medications listed below when I get home from the hospital:    mirtazapine 15 mg oral tablet  -- 1 tab(s) by mouth once a day (at bedtime)  -- Indication: For malnutrition    cefuroxime 250 mg oral tablet  -- 1 tab(s) by mouth every 12 hours until 6/21  -- Indication: For uti    docusate sodium 100 mg oral capsule  -- 1 cap(s) by mouth 2 times a day  -- Indication: For Constipation    pantoprazole 40 mg oral delayed release tablet  -- 1 tab(s) by mouth once a day (before a meal)  -- Indication: For gerd    Multiple Vitamins oral tablet  -- 1 tab(s) by mouth once a day  -- Indication: For vitamins

## 2017-06-19 NOTE — PHYSICAL THERAPY INITIAL EVALUATION ADULT - ADDITIONAL COMMENTS
History taken from pt. who reports living in house. SW states pt. lives w/ pt.'s daughter. Pt. reports having 4 steps to ambulate in house and ambulates independently.

## 2017-06-19 NOTE — PHYSICAL THERAPY INITIAL EVALUATION ADULT - PERTINENT HX OF CURRENT PROBLEM, REHAB EVAL
Pt. presents to hospital w/ weakness, fatigue and failure to thrive. Pt. has chronic anemia. CTH: (-); CXR: small L pleural effusion

## 2017-06-19 NOTE — PROGRESS NOTE ADULT - SUBJECTIVE AND OBJECTIVE BOX
CHIEF COMPLAINT: weakness, failure to thrive    SUBJECTIVE: Pt pulled out IV and refused new IV.  Does not want IVF or IV abx. Agreeable to oral antibiotics. Has not complaints.    REVIEW OF SYSTEMS:  All other review of systems is negative unless indicated above    Vital Signs Last 24 Hrs  T(C): 36.7, Max: 36.9 (06-18 @ 05:39)  T(F): 98.1, Max: 98.4 (06-18 @ 05:39)  HR: 88 (82 - 99)  BP: 165/80 (112/52 - 165/80)  BP(mean): 66 (66 - 66)  RR: 18 (17 - 18)  SpO2: 95% (90% - 96%)    PHYSICAL EXAM:  Constitutional: NAD, AO x2.   HEENT: EOMI,  MMM  Neck: Soft and supple, No JVD  Respiratory: Breath sounds are clear bilaterally, No wheezing, rales or rhonchi  Cardiovascular: S1S2 irregular. tachycardic.  Gastrointestinal: Bowel Sounds present, soft, nontender, nondistended, no guarding, no rebound  Extremities: No peripheral edema  Vascular: 2+ peripheral pulses  Neurological: A/O x 3, no focal deficits  Musculoskeletal: 5/5 strength b/l upper and lower extremities  Skin: No rashes    MEDICATIONS:  MEDICATIONS  (STANDING):  docusate sodium 100milliGRAM(s) Oral two times a day  pantoprazole    Tablet 40milliGRAM(s) Oral before breakfast  mirtazapine 15milliGRAM(s) Oral at bedtime  heparin  Injectable 5000Unit(s) SubCutaneous every 12 hours  multivitamin 1Tablet(s) Oral daily  cefuroxime   Tablet 250milliGRAM(s) Oral every 12 hours      LABS:         06-19    148<H>  |  117<H>  |  28<H>  ----------------------------<  94  3.9   |  25  |  1.17    Ca    8.1<L>      19 Jun 2017 06:07      CAPILLARY BLOOD GLUCOSE                  Assessment and Plan:   · Assessment		  89yo female a/w failure to thrive and possible sepsis    # Weakness/Fatigue/Failure - multi-factorial, likely related to underlying acute on chronic comorbidities including CLL/anemia, dementia, severe protein malnutrition and possible UTI.  - afebrile, HD stable, O2 sat 97% on room air.  Tachycardic, WBC elevated d/t underlying leukemia.   - UA positive however is comtaminated w many sq epis.  Ucx was not sent.  , CXR without focal consolidation, has chronic elevated WBC 2/2 CLL  - CT head negative  - BCx negative.  - baseline Cr 1.5 as per PMD  - PT consult appreciated.  - ECHO refused by patient  - monitor HH, would transfuse HH<7  - got dose of rocephin however refusing IV. switch to po ceftin day 2 abx.  - Palliative care consult appreciated, goals of care discussion- daughter wants full code for now, still deciding. Pall care signed off.  I had a long discussion with the daughter who still wants patient to be a full code.    # Dehydration- improved  - IVF stopped d/t no IV access, pt refusing new IV. encourage po.    # Hypernatremia: Borderline, secondary to poor po intake.  - encourage po as pt refusing IV    Lower extremity edema: Multifactorial, poor albumin/nutrition, poor functional status, underlying CLL and comorbidities.     # Tachycardia (~140), PAT.  - No afib on EKG or on tele monitor, few episodes PAT on monitor  - outpt follow up.    # Dementia  - fall precaution    #SHEN on CKD3- resolved  - likely prerenal    # Severe protein calorie malnutrition  - ensure enlive supplement    # DVT ppx, HSQ    [facility]:  -[facility] to [facility].  Pt refusing a lot of treatment.  Her overall prognosis is very poor however family does not wish to discuss goals of care or pursue comfort care measures.  Pt is very high risk for 30 day readmission given this.     Attending Statement:  40 minutes spent on total encounter; more than 50% of the visit was spent counseling and/or coordinating care by the attending physician.

## 2017-06-19 NOTE — DISCHARGE NOTE ADULT - PATIENT PORTAL LINK FT
“You can access the [employer] Patient Portal, offered by [facility], by registering with the following website: [email or web address]”

## 2017-06-19 NOTE — DISCHARGE NOTE ADULT - CARE PLAN
Principal Discharge DX:	Weakness generalized  Goal:	supportive care  Instructions for follow-up, activity and diet:	follow up with your pcp and outpatient specialists.  Secondary Diagnosis:	Anemia, unspecified type  Goal:	supportive care.  Instructions for follow-up, activity and diet:	You may need a blood transfusion in the future. Please follow up with your pcp and or hematologist/oncologist.  Secondary Diagnosis:	CLL (chronic lymphocytic leukemia)  Goal:	supportive care.  Instructions for follow-up, activity and diet:	Please follow up with your pcp and or hematologist/oncologist.  Secondary Diagnosis:	Dementia  Goal:	supportive care.  Secondary Diagnosis:	Urinary tract infection without hematuria, site unspecified  Goal:	take antibiotics as prescribed.

## 2017-06-19 NOTE — DISCHARGE NOTE ADULT - SECONDARY DIAGNOSIS.
Anemia, unspecified type CLL (chronic lymphocytic leukemia) Dementia Urinary tract infection without hematuria, site unspecified

## 2017-06-20 VITALS
TEMPERATURE: 98 F | OXYGEN SATURATION: 91 % | DIASTOLIC BLOOD PRESSURE: 65 MMHG | SYSTOLIC BLOOD PRESSURE: 116 MMHG | RESPIRATION RATE: 16 BRPM | HEART RATE: 94 BPM

## 2017-06-20 NOTE — PROGRESS NOTE ADULT - SUBJECTIVE AND OBJECTIVE BOX
CHIEF COMPLAINT: weakness, failure to thrive    SUBJECTIVE: No new events. Pt status quo. Eating lunch.  Spoke with HCP about DNR/DNI.  Wishes for pt to be DNR/DNI.  They would like her to go back to the facility at this time.    REVIEW OF SYSTEMS:  All other review of systems is negative unless indicated above    Vital Signs Last 24 Hrs  T(C): 36.6, Max: 36.8 (06-20 @ 05:08)  T(F): 97.8, Max: 98.3 (06-20 @ 05:08)  HR: 76 (76 - 91)  BP: 98/69 (98/69 - 104/58)  BP(mean): 77 (70 - 77)  RR: 16 (16 - 18)  SpO2: 95% (94% - 96%)    PHYSICAL EXAM:  Constitutional: NAD, AO x2.   HEENT: EOMI,  MMM  Neck: Soft and supple, No JVD  Respiratory: Breath sounds are clear bilaterally, No wheezing, rales or rhonchi  Cardiovascular: S1S2 irregular. tachycardic.  Gastrointestinal: Bowel Sounds present, soft, nontender, nondistended, no guarding, no rebound  Extremities: No peripheral edema  Vascular: 2+ peripheral pulses  Neurological: A/O x 3, no focal deficits  Musculoskeletal: 5/5 strength b/l upper and lower extremities  Skin: No rashes    MEDICATIONS  (STANDING):  docusate sodium 100milliGRAM(s) Oral two times a day  pantoprazole    Tablet 40milliGRAM(s) Oral before breakfast  mirtazapine 15milliGRAM(s) Oral at bedtime  heparin  Injectable 5000Unit(s) SubCutaneous every 12 hours  multivitamin 1Tablet(s) Oral daily  cefuroxime   Tablet 250milliGRAM(s) Oral every 12 hours    MEDICATIONS  (PRN):  ondansetron Injectable 4milliGRAM(s) IV Push every 6 hours PRN Nausea            06-19    148<H>  |  117<H>  |  28<H>  ----------------------------<  94  3.9   |  25  |  1.17    Ca    8.1<L>      19 Jun 2017 06:07      CAPILLARY BLOOD GLUCOSE                            Assessment and Plan:   · Assessment		  89yo female a/w failure to thrive and possible sepsis    # Weakness/Fatigue/Failure - multi-factorial, likely related to underlying acute on chronic comorbidities including CLL/anemia, dementia, severe protein malnutrition and possible UTI.  - afebrile, HD stable, O2 sat 97% on room air.  Tachycardic, WBC elevated d/t underlying leukemia.   - UA positive however is contaminated w many sq epis.  Ucx was not sent.  , CXR without focal consolidation, has chronic elevated WBC 2/2 CLL  - CT head negative  - BCx negative.  - baseline Cr 1.5 as per PMD  - PT consult appreciated.  - ECHO refused by patient  - monitor HH, would transfuse HH<7  - got dose of rocephin however refusing IV. switch to po ceftin day 3 abx.  - Palliative care consult appreciated, goals of care discussion discussion was attempted but daughter wanted everything done.  Repeat conversation on 6/20 about advanced directives and now wish for her to be DNR/DNI.    # Dehydration- improved  - IVF stopped d/t no IV access, pt refusing new IV. encourage po.    # Hypernatremia: Borderline, secondary to poor po intake.  - encourage po as pt refusing IV    Lower extremity edema: Multifactorial, poor albumin/nutrition, poor functional status, underlying CLL and comorbidities.     # Tachycardia (~140), PAT.  - No afib on EKG or on tele monitor, few episodes PAT on monitor  - outpt follow up.    # Dementia  - fall precaution    #SHEN on CKD3- resolved  - likely prerenal    # Severe protein calorie malnutrition  - ensure enlive supplement    # DVT ppx, HSQ    [facility]:  -[facility] to [facility].  Pt refusing a lot of treatment.  Her overall prognosis is very poor however family is not ready to pursue comfort care measures.  Pt is very high risk for 30 day readmission.  She is however now DNR/DNI.    Attending Statement:  40 minutes spent on total encounter; more than 50% of the visit was spent counseling and/or coordinating care by the attending physician.

## 2017-06-22 LAB
CULTURE RESULTS: SIGNIFICANT CHANGE UP
CULTURE RESULTS: SIGNIFICANT CHANGE UP
SPECIMEN SOURCE: SIGNIFICANT CHANGE UP
SPECIMEN SOURCE: SIGNIFICANT CHANGE UP

## 2017-06-23 DIAGNOSIS — C91.10 CHRONIC LYMPHOCYTIC LEUKEMIA OF B-CELL TYPE NOT HAVING ACHIEVED REMISSION: ICD-10-CM

## 2017-06-23 DIAGNOSIS — D64.9 ANEMIA, UNSPECIFIED: ICD-10-CM

## 2017-06-23 DIAGNOSIS — I47.1 SUPRAVENTRICULAR TACHYCARDIA: ICD-10-CM

## 2017-06-23 DIAGNOSIS — N18.3 CHRONIC KIDNEY DISEASE, STAGE 3 (MODERATE): ICD-10-CM

## 2017-06-23 DIAGNOSIS — E87.0 HYPEROSMOLALITY AND HYPERNATREMIA: ICD-10-CM

## 2017-06-23 DIAGNOSIS — F03.90 UNSPECIFIED DEMENTIA, UNSPECIFIED SEVERITY, WITHOUT BEHAVIORAL DISTURBANCE, PSYCHOTIC DISTURBANCE, MOOD DISTURBANCE, AND ANXIETY: ICD-10-CM

## 2017-06-23 DIAGNOSIS — E43 UNSPECIFIED SEVERE PROTEIN-CALORIE MALNUTRITION: ICD-10-CM

## 2017-06-23 DIAGNOSIS — E86.0 DEHYDRATION: ICD-10-CM

## 2017-06-23 DIAGNOSIS — I25.10 ATHEROSCLEROTIC HEART DISEASE OF NATIVE CORONARY ARTERY WITHOUT ANGINA PECTORIS: ICD-10-CM

## 2017-06-23 DIAGNOSIS — R62.7 ADULT FAILURE TO THRIVE: ICD-10-CM

## 2017-06-23 DIAGNOSIS — I48.91 UNSPECIFIED ATRIAL FIBRILLATION: ICD-10-CM

## 2017-06-23 DIAGNOSIS — R55 SYNCOPE AND COLLAPSE: ICD-10-CM

## 2017-06-23 DIAGNOSIS — Z66 DO NOT RESUSCITATE: ICD-10-CM

## 2019-07-30 NOTE — ED PROVIDER NOTE - CPE EDP ENMT NORM
Patient's cholesterol is under control with present treatment  Patient will continue present medication, low dose of Lipitor at 10 mg a day    We will check a lipid profile with her next visit in 6 months normal...
